# Patient Record
Sex: FEMALE | URBAN - METROPOLITAN AREA
[De-identification: names, ages, dates, MRNs, and addresses within clinical notes are randomized per-mention and may not be internally consistent; named-entity substitution may affect disease eponyms.]

---

## 2023-10-12 ENCOUNTER — APPOINTMENT (RX ONLY)
Dept: URBAN - METROPOLITAN AREA CLINIC 114 | Facility: CLINIC | Age: 28
Setting detail: DERMATOLOGY
End: 2023-10-12

## 2023-10-12 DIAGNOSIS — Z41.9 ENCOUNTER FOR PROCEDURE FOR PURPOSES OTHER THAN REMEDYING HEALTH STATE, UNSPECIFIED: ICD-10-CM

## 2023-10-12 PROCEDURE — ? HYDRAFACIAL

## 2023-10-12 ASSESSMENT — LOCATION SIMPLE DESCRIPTION DERM: LOCATION SIMPLE: RIGHT FOREHEAD

## 2023-10-12 ASSESSMENT — LOCATION ZONE DERM: LOCATION ZONE: FACE

## 2023-10-12 ASSESSMENT — LOCATION DETAILED DESCRIPTION DERM: LOCATION DETAILED: RIGHT INFERIOR MEDIAL FOREHEAD

## 2023-10-12 NOTE — PROCEDURE: HYDRAFACIAL
Tip Override
Number Of Passes: 2
Procedure: Fusion
Stroke (Optional): short feather-like strokes
Price (Use Numbers Only, No Special Characters Or $): 200
Number Of Passes: 0
Treatment Number: 1
Consent: Written consent obtained, risks reviewed including but not limited to crusting, scabbing, blistering, scarring, darker or lighter pigmentary change, bruising, and/or incomplete response.
Procedure: Boost
Solution: Antiox-6
Tip: Hydropeel Tip, Teal
Post-Care Instructions: I reviewed with the patient in detail post-care instructions. Patient should stay away from the sun and wear sun protection until treated areas are fully healed.
Vacuum Pressure Low Setting (Will Not Render If Set To 0): 16
Indication: dehydrated skin
Vacuum Pressure Low Setting (Will Not Render If Set To 0): 16
Procedure: Extraction
Procedure: Exfoliation
Solution Override
Vacuum Pressure Low Setting (Will Not Render If Set To 0): 3
Location: face
Stroke (Optional): stamping
Tip: Hydropeel Tip, Clear
Solution: Activ-4
Stroke (Optional): long stroke. 60% overlap
Solution: Beta-HD
Tip: Hydropeel Tip, Blue
Vacuum Pressure Low Setting (Will Not Render If Set To 0): 18
Solution Override: NOGLY/SAL PREP PER PT REQUEST

## 2023-12-07 ENCOUNTER — APPOINTMENT (RX ONLY)
Dept: URBAN - METROPOLITAN AREA CLINIC 114 | Facility: CLINIC | Age: 28
Setting detail: DERMATOLOGY
End: 2023-12-07

## 2023-12-07 DIAGNOSIS — Z41.9 ENCOUNTER FOR PROCEDURE FOR PURPOSES OTHER THAN REMEDYING HEALTH STATE, UNSPECIFIED: ICD-10-CM

## 2023-12-07 PROCEDURE — ? HYDRAFACIAL

## 2023-12-07 ASSESSMENT — LOCATION DETAILED DESCRIPTION DERM: LOCATION DETAILED: RIGHT INFERIOR MEDIAL FOREHEAD

## 2023-12-07 ASSESSMENT — LOCATION SIMPLE DESCRIPTION DERM: LOCATION SIMPLE: RIGHT FOREHEAD

## 2023-12-07 ASSESSMENT — LOCATION ZONE DERM: LOCATION ZONE: FACE

## 2023-12-07 NOTE — PROCEDURE: HYDRAFACIAL
Tip Override
Number Of Passes: 2
Procedure: Fusion
Stroke (Optional): short feather-like strokes
Price (Use Numbers Only, No Special Characters Or $): 200
Number Of Passes: 0
Treatment Number: 1
Consent: Written consent obtained, risks reviewed including but not limited to crusting, scabbing, blistering, scarring, darker or lighter pigmentary change, bruising, and/or incomplete response.
Procedure: Boost
Solution: Antiox-6
Tip: Hydropeel Tip, Teal
Post-Care Instructions: I reviewed with the patient in detail post-care instructions. Patient should stay away from the sun and wear sun protection until treated areas are fully healed.
Vacuum Pressure Low Setting (Will Not Render If Set To 0): 16
Indication: prominent pores
Vacuum Pressure Low Setting (Will Not Render If Set To 0): 16
Procedure: Extraction
Procedure: Exfoliation
Solution Override
Vacuum Pressure Low Setting (Will Not Render If Set To 0): 3
Location: face
Stroke (Optional): stamping
Tip: Hydropeel Tip, Clear
Solution: Activ-4
Stroke (Optional): long stroke. 60% overlap
Solution: Beta-HD
Tip: Hydropeel Tip, Blue
Vacuum Pressure Low Setting (Will Not Render If Set To 0): 18
Solution Override: NOGLY/SAL PREP PER PT REQUEST